# Patient Record
Sex: MALE | Race: WHITE | NOT HISPANIC OR LATINO | Employment: FULL TIME | URBAN - METROPOLITAN AREA
[De-identification: names, ages, dates, MRNs, and addresses within clinical notes are randomized per-mention and may not be internally consistent; named-entity substitution may affect disease eponyms.]

---

## 2020-02-24 ENCOUNTER — OFFICE VISIT (OUTPATIENT)
Dept: PULMONOLOGY | Facility: CLINIC | Age: 50
End: 2020-02-24

## 2020-02-24 VITALS
SYSTOLIC BLOOD PRESSURE: 152 MMHG | TEMPERATURE: 97.7 F | BODY MASS INDEX: 35.93 KG/M2 | DIASTOLIC BLOOD PRESSURE: 100 MMHG | HEART RATE: 74 BPM | HEIGHT: 75 IN | OXYGEN SATURATION: 96 % | WEIGHT: 289 LBS

## 2020-02-24 DIAGNOSIS — J41.1 BRONCHITIS, MUCOPURULENT RECURRENT (HCC): Primary | ICD-10-CM

## 2020-02-24 DIAGNOSIS — Z78.9 NON-SMOKER: ICD-10-CM

## 2020-02-24 PROCEDURE — 94726 PLETHYSMOGRAPHY LUNG VOLUMES: CPT | Performed by: INTERNAL MEDICINE

## 2020-02-24 PROCEDURE — 94729 DIFFUSING CAPACITY: CPT | Performed by: INTERNAL MEDICINE

## 2020-02-24 PROCEDURE — 99204 OFFICE O/P NEW MOD 45 MIN: CPT | Performed by: INTERNAL MEDICINE

## 2020-02-24 PROCEDURE — 94375 RESPIRATORY FLOW VOLUME LOOP: CPT | Performed by: INTERNAL MEDICINE

## 2020-02-24 RX ORDER — HYDROCHLOROTHIAZIDE 25 MG/1
TABLET ORAL
COMMUNITY

## 2020-02-24 RX ORDER — LISINOPRIL 10 MG/1
TABLET ORAL
COMMUNITY

## 2020-02-24 RX ORDER — ASPIRIN 81 MG/1
TABLET ORAL
COMMUNITY

## 2020-02-24 RX ORDER — CARVEDILOL 3.12 MG/1
TABLET ORAL
COMMUNITY

## 2020-02-24 RX ORDER — ATORVASTATIN CALCIUM 40 MG/1
TABLET, FILM COATED ORAL
COMMUNITY

## 2020-02-24 NOTE — PROGRESS NOTES
"  PULMONARY  NOTE    Chief Complaint     Recurrent bronchitis, non-smoker, environmental exposures    History of Present Illness     50-year-old white male referred for evaluation of recurrent respiratory tract infections.    He indicates that he has a history of \"asthmatic bronchitis\" as a child.  It was initially better as an adult but for the last several years he has had recurrent episodes of acute bronchitis.  He recollects having 6-8 episodes of acute bronchitis in the last 2 years.    He does not cough on a regular basis, only when he is ill.  He does not regularly have dyspnea on exertion.    He has been on about 3 rounds of antibiotics and 3 rounds of steroids in the last 12 months.    In between his infections, he feels that he does well and has little in the way of respiratory symptoms.    He is a non-smoker.    He is worked as a  and currently works as an  for the [employer].  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    He does not have regular reflux symptoms.,  Just occasionally.  He does not regularly take a acid reducing medicine.    He does not have regular sinus symptoms, either    Patient Active Problem List   Diagnosis   • Recurrent bronchitis   • Non-smoker     No Known Allergies    Current Outpatient Medications:   •  aspirin (ASPIR-LOW) 81 MG EC tablet, Aspir-Low 81 mg tablet,delayed release, Disp: , Rfl:   •  atorvastatin (LIPITOR) 40 MG tablet, atorvastatin 40 mg tablet, Disp: , Rfl:   •  carvedilol (COREG) 3.125 MG tablet, carvedilol 3.125 mg tablet, Disp: , Rfl:   •  hydroCHLOROthiazide (HYDRODIURIL) 25 MG tablet, hydrochlorothiazide 25 mg tablet, Disp: , Rfl:   •  lisinopril (PRINIVIL,ZESTRIL) 10 MG tablet, lisinopril 10 mg tablet, Disp: , Rfl:   MEDICATION LIST AND ALLERGIES REVIEWED.    Family History   Problem Relation Age of Onset   • COPD Mother    • Hypertension Mother    • No " "Known Problems Father      Social History     Tobacco Use   • Smoking status: Never Smoker   • Smokeless tobacco: Former User     Types: Chew   Substance Use Topics   • Alcohol use: Never     Frequency: Never   • Drug use: Never     Social History     Social History Narrative        Currently works as an  for the [place] [employer]    Has worked in the coal mining industry in the past.    Has had exposure to a specialist, coal, fiberglass, and rock dust    Lifelong non-smoker    Does not drink alcohol     FAMILY AND SOCIAL HISTORY REVIEWED.    Review of Systems  ALSO REFER TO SCANNED ROS SHEET FROM SAME DATE.    /100   Pulse 74   Temp 97.7 °F (36.5 °C)   Ht 189.2 cm (74.5\")   Wt 131 kg (289 lb)   SpO2 96% Comment: resting at room air  BMI 36.61 kg/m²   Physical Exam   Constitutional: He is oriented to person, place, and time. He appears well-developed. No distress.   HENT:   Head: Normocephalic and atraumatic.   Neck: No thyromegaly present.   Cardiovascular: Normal rate, regular rhythm and normal heart sounds.   No murmur heard.  Pulmonary/Chest: Effort normal and breath sounds normal. No stridor.   Abdominal: Soft. Bowel sounds are normal.   Musculoskeletal: Normal range of motion. He exhibits no edema.   Lymphadenopathy:     He has no cervical adenopathy.        Right: No supraclavicular and no epitrochlear adenopathy present.        Left: No supraclavicular and no epitrochlear adenopathy present.   Neurological: He is alert and oriented to person, place, and time.   Skin: Skin is warm and dry. He is not diaphoretic.   Psychiatric: He has a normal mood and affect. His behavior is normal.   Nursing note and vitals reviewed.      Results     PA and lateral chest x-ray from [facility] dated 2/18/2020 reviewed on PACS.  No evidence of active disease.    PFTs reveal no airway obstruction, no restriction, and a normal diffusion capacity    Problem List       ICD-10-CM ICD-9-CM "   1. Recurrent bronchitis J41.1 491.1   2. Non-smoker Z78.9 V49.89       Discussion     We reviewed his test results.  I reassured him that his exam, PFTs, and chest x-ray are unremarkable.  He has no evidence of chronic obstructive or interstitial lung disease.    However, given his recurrent respiratory tract infections and lack of contributing factors such as GERD or sinus disease, I recommended an HRCT to help exclude bronchiectasis.    In the meantime, he is going to remain on his same medical regimen.    I will plan to see him back in follow-up to discuss the CT scan findings and also if he develops an acute respiratory tract infection in the future    Nadir Pope MD  Note electronically signed    CC: Collins Melendez MD

## 2020-02-25 DIAGNOSIS — J41.1 BRONCHITIS, MUCOPURULENT RECURRENT (HCC): Primary | ICD-10-CM

## 2020-03-03 ENCOUNTER — HOSPITAL ENCOUNTER (OUTPATIENT)
Dept: CT IMAGING | Facility: HOSPITAL | Age: 50
Discharge: HOME | End: 2020-03-03
Admitting: INTERNAL MEDICINE

## 2020-03-03 DIAGNOSIS — J41.1 BRONCHITIS, MUCOPURULENT RECURRENT (HCC): ICD-10-CM

## 2020-03-03 PROCEDURE — 71250 CT THORAX DX C-: CPT

## 2020-03-14 ENCOUNTER — TRANSCRIBE ORDERS (OUTPATIENT)
Dept: PULMONOLOGY | Facility: CLINIC | Age: 50
End: 2020-03-14

## 2020-03-27 ENCOUNTER — OFFICE VISIT (OUTPATIENT)
Dept: PULMONOLOGY | Facility: CLINIC | Age: 50
End: 2020-03-27

## 2020-03-27 VITALS
TEMPERATURE: 97.8 F | SYSTOLIC BLOOD PRESSURE: 142 MMHG | HEIGHT: 75 IN | DIASTOLIC BLOOD PRESSURE: 72 MMHG | BODY MASS INDEX: 34.91 KG/M2 | OXYGEN SATURATION: 96 % | WEIGHT: 280.8 LBS | HEART RATE: 75 BPM

## 2020-03-27 DIAGNOSIS — Z78.9 NON-SMOKER: ICD-10-CM

## 2020-03-27 DIAGNOSIS — J41.1 BRONCHITIS, MUCOPURULENT RECURRENT (HCC): Primary | ICD-10-CM

## 2020-03-27 PROCEDURE — 99214 OFFICE O/P EST MOD 30 MIN: CPT | Performed by: INTERNAL MEDICINE

## 2020-03-27 NOTE — PROGRESS NOTES
PULMONARY  NOTE    Chief Complaint     Recurrent bronchitis, non-smoker, prior environmental exposures    History of Present Illness     50-year-old white male returns today for follow-up.  I initially saw him on 2/24/2020.    He has a history of asthmatic bronchitis as a child.  He also has had frequent episodes of acute bronchitis over the last several years.  When I last saw him he recollected taking approximately 3 rounds of antibiotics and 3 rounds of steroids in the preceding 12 months.    In between these episodes, he has no respiratory symptoms.  He is a non-smoker.    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  He has been concerned about permanent lung damage from prior exposures.    No regular reflux symptoms.  No regular sinus symptoms.    Patient Active Problem List   Diagnosis   • Recurrent bronchitis   • Non-smoker     No Known Allergies    Current Outpatient Medications:   •  aspirin (ASPIR-LOW) 81 MG EC tablet, Aspir-Low 81 mg tablet,delayed release, Disp: , Rfl:   •  atorvastatin (LIPITOR) 40 MG tablet, atorvastatin 40 mg tablet, Disp: , Rfl:   •  carvedilol (COREG) 3.125 MG tablet, carvedilol 3.125 mg tablet, Disp: , Rfl:   •  hydroCHLOROthiazide (HYDRODIURIL) 25 MG tablet, hydrochlorothiazide 25 mg tablet, Disp: , Rfl:   •  lisinopril (PRINIVIL,ZESTRIL) 10 MG tablet, lisinopril 10 mg tablet, Disp: , Rfl:   MEDICATION LIST AND ALLERGIES REVIEWED.    Family History   Problem Relation Age of Onset   • COPD Mother    • Hypertension Mother    • No Known Problems Father      Social History     Tobacco Use   • Smoking status: Never Smoker   • Smokeless tobacco: Former User     Types: Chew   Substance Use Topics   • Alcohol use: Never     Frequency: Never   • Drug use: Never     Social History     Social History Narrative        Currently works as an  for the [place]  [employer]    Has worked in the coal mining industry in the past.    Has had exposure to a specialist, coal, fiberglass, and rock dust    Lifelong non-smoker    Does not drink alcohol     FAMILY AND SOCIAL HISTORY REVIEWED.    Review of Systems  ALSO REFER TO SCANNED ROS SHEET FROM SAME DATE.    /72 (BP Location: Right arm, Patient Position: Sitting, Cuff Size: Adult)   Pulse 75   Temp 97.8 °F (36.6 °C) (Temporal)   Ht 189.2 cm (74.5\")   Wt 127 kg (280 lb 12.8 oz)   SpO2 96% Comment: room air seated  BMI 35.57 kg/m²   Physical Exam   Constitutional: He is oriented to person, place, and time. He appears well-developed. No distress.   HENT:   Head: Normocephalic and atraumatic.   Neck: No thyromegaly present.   Cardiovascular: Normal rate, regular rhythm and normal heart sounds.   No murmur heard.  Pulmonary/Chest: Effort normal and breath sounds normal. No stridor.   Abdominal: Soft. Bowel sounds are normal.   Musculoskeletal: Normal range of motion. He exhibits no edema.   Lymphadenopathy:     He has no cervical adenopathy.        Right: No supraclavicular and no epitrochlear adenopathy present.        Left: No supraclavicular and no epitrochlear adenopathy present.   Neurological: He is alert and oriented to person, place, and time.   Skin: Skin is warm and dry. He is not diaphoretic.   Psychiatric: He has a normal mood and affect. His behavior is normal.   Nursing note and vitals reviewed.      Results     High-resolution CT scan of the chest reviewed on PACS.  No evidence of bronchiectasis or interstitial lung disease    Problem List       ICD-10-CM ICD-9-CM   1. Recurrent bronchitis J41.1 491.1   2. Non-smoker Z78.9 V49.89       Discussion     We again reviewed his test results.  Exam, PFTs, and HRCT scan of the chest are unremarkable/normal.  There is no evidence of any chronic interstitial or obstructive lung disease.    He still may have asthma as a cause of his symptoms.  We discussed doing " more of a work-up for asthma, such as with a methacholine challenge and CPX.  At this point his primary goal was just to make sure he did not have any permanent damage to his lungs from his past exposures.    At this point, I have asked him to return the next time he has an episode of acute bronchitis and we will reevaluate.    Nadir Pope MD  Note electronically signed    CC: Collins Melendez MD

## 2022-08-31 ENCOUNTER — HOSPITAL ENCOUNTER (OUTPATIENT)
Dept: HOSPITAL 79 - RAD | Age: 52
End: 2022-08-31
Attending: FAMILY MEDICINE
Payer: SELF-PAY

## 2022-08-31 DIAGNOSIS — M47.26: ICD-10-CM

## 2022-08-31 DIAGNOSIS — M51.16: Primary | ICD-10-CM

## 2022-09-16 ENCOUNTER — HOSPITAL ENCOUNTER (OUTPATIENT)
Dept: HOSPITAL 79 - EMI | Age: 52
End: 2022-09-16
Attending: FAMILY MEDICINE
Payer: COMMERCIAL

## 2022-09-16 DIAGNOSIS — M48.07: ICD-10-CM

## 2022-09-16 DIAGNOSIS — M51.17: ICD-10-CM

## 2022-09-16 DIAGNOSIS — M48.061: ICD-10-CM

## 2022-09-16 DIAGNOSIS — M47.817: ICD-10-CM

## 2022-09-16 DIAGNOSIS — M51.16: Primary | ICD-10-CM

## 2022-09-16 DIAGNOSIS — M47.816: ICD-10-CM
